# Patient Record
Sex: MALE | Race: WHITE | Employment: UNEMPLOYED | URBAN - METROPOLITAN AREA
[De-identification: names, ages, dates, MRNs, and addresses within clinical notes are randomized per-mention and may not be internally consistent; named-entity substitution may affect disease eponyms.]

---

## 2023-01-30 ENCOUNTER — HOSPITAL ENCOUNTER (EMERGENCY)
Age: 73
Discharge: HOME | End: 2023-01-30
Attending: EMERGENCY MEDICINE
Payer: OTHER GOVERNMENT

## 2023-01-30 ENCOUNTER — APPOINTMENT (OUTPATIENT)
Dept: GENERAL RADIOLOGY | Age: 73
End: 2023-01-30
Payer: OTHER GOVERNMENT

## 2023-01-30 VITALS
OXYGEN SATURATION: 100 % | DIASTOLIC BLOOD PRESSURE: 75 MMHG | RESPIRATION RATE: 16 BRPM | SYSTOLIC BLOOD PRESSURE: 152 MMHG | HEART RATE: 98 BPM | TEMPERATURE: 98.5 F

## 2023-01-30 DIAGNOSIS — L03.031 CELLULITIS OF TOE OF RIGHT FOOT: Primary | ICD-10-CM

## 2023-01-30 PROCEDURE — 73660 X-RAY EXAM OF TOE(S): CPT

## 2023-01-30 PROCEDURE — 6370000000 HC RX 637 (ALT 250 FOR IP): Performed by: STUDENT IN AN ORGANIZED HEALTH CARE EDUCATION/TRAINING PROGRAM

## 2023-01-30 PROCEDURE — 99283 EMERGENCY DEPT VISIT LOW MDM: CPT

## 2023-01-30 RX ORDER — HYDROCODONE BITARTRATE AND ACETAMINOPHEN 5; 325 MG/1; MG/1
1 TABLET ORAL EVERY 6 HOURS PRN
Qty: 12 TABLET | Refills: 0 | Status: SHIPPED | OUTPATIENT
Start: 2023-01-30 | End: 2023-02-02

## 2023-01-30 RX ORDER — HYDROCODONE BITARTRATE AND ACETAMINOPHEN 7.5; 325 MG/1; MG/1
1 TABLET ORAL
Status: COMPLETED | OUTPATIENT
Start: 2023-01-30 | End: 2023-01-30

## 2023-01-30 RX ORDER — ONDANSETRON 4 MG/1
4 TABLET, ORALLY DISINTEGRATING ORAL
Status: COMPLETED | OUTPATIENT
Start: 2023-01-30 | End: 2023-01-30

## 2023-01-30 RX ORDER — DOXYCYCLINE HYCLATE 100 MG
100 TABLET ORAL 2 TIMES DAILY
Qty: 14 TABLET | Refills: 0 | Status: SHIPPED | OUTPATIENT
Start: 2023-01-30 | End: 2023-02-06

## 2023-01-30 RX ADMIN — ONDANSETRON 4 MG: 4 TABLET, ORALLY DISINTEGRATING ORAL at 16:31

## 2023-01-30 RX ADMIN — HYDROCODONE BITARTRATE AND ACETAMINOPHEN 1 TABLET: 7.5; 325 TABLET ORAL at 16:32

## 2023-01-30 ASSESSMENT — ENCOUNTER SYMPTOMS
CHEST TIGHTNESS: 0
VOMITING: 0
ABDOMINAL PAIN: 0
VOICE CHANGE: 0
EYE PAIN: 0
DIARRHEA: 0
SORE THROAT: 0
SHORTNESS OF BREATH: 0
APNEA: 0
FACIAL SWELLING: 0
WHEEZING: 0
PHOTOPHOBIA: 0
EYE DISCHARGE: 0
EYE REDNESS: 0
COUGH: 0
TROUBLE SWALLOWING: 0
RHINORRHEA: 0

## 2023-01-30 ASSESSMENT — PAIN DESCRIPTION - LOCATION: LOCATION: TOE (COMMENT WHICH ONE)

## 2023-01-30 ASSESSMENT — PAIN SCALES - GENERAL: PAINLEVEL_OUTOF10: 10

## 2023-01-30 ASSESSMENT — PAIN DESCRIPTION - ORIENTATION: ORIENTATION: RIGHT

## 2023-01-30 NOTE — ED PROVIDER NOTES
Emergency Department Provider Note                   PCP:                No primary care provider on file. Age: 67 y.o. Sex: male       ICD-10-CM    1. Cellulitis of toe of right foot  L03.031 HYDROcodone-acetaminophen (NORCO) 5-325 MG per tablet          DISPOSITION Decision To Discharge 01/30/2023 04:50:12 PM        Medical Decision Making  In summary this is a 59-year-old male patient presenting with symptoms most consistent with simple skin cellulitis. Based on my evaluation I feel the patient is at low risk for alternative diagnoses such as lymphangitis, abscess, sepsis, necrotizing infection, osteomyelitis. The reasoning behind my medical decision making process is that the patient is grossly well-appearing on exam and in no acute distress. Vitals are stable without fever, tachycardia, tachypnea, hypoxia, hypotension. Patient does not meet SIRS or qSOFA criteria for sepsis at this time. Considered lab work but due to very minor appearance of physical exam and well-appearing nature of patient, did not feel it is indicated today. My independent interpretation of X ray did not reveal soft tissue gas or infectious extension into the bone. No signs of pain out of proportion, necrosis, or subcutaneous emphysema. Given negative x ray and low suspicion for necrotizing fasciitis, do not feel CT warranted at this time. There is no fluctuance or purulence to suggest abscess. There is no lymphangitic streaking. No skin sloughing or mucous membrane involvement to suggest SJS. The plan for this patient is outpatient management. The patient will be started on a course of antibiotics. Advised follow-up with primary care doctor in the next few days to be sure symptoms are improving. I specifically counseled the patient warning signs that they return immediately for including but not limited to dyspnea, intractable fever, worsening symptoms.   The patient has verbalized understanding and is in agreement with the treatment plan. All concerns addressed. Amount and/or Complexity of Data Reviewed  Radiology: ordered. Risk  Prescription drug management. Complexity of Problem: 1 stable, acute illness. (3)    I have conducted an independent ordering and review of X-rays. Considerations: Shared decision making was utilized in the care of this patient. Considerations: The following labs and/or imaging studies were considered but not ordered: CBC, CMP, lactic, Pro-Regulo, CRP, ESR        Patient was discharged risks and benefits of hospitalization were discussed. ED Course as of 01/30/23 1656   Mon Jan 30, 2023   1649 4:49 PM  Agree with radiology interpretation of unremarkable toe x-ray [NR]      ED Course User Index  [NR] FLY Gonzáles        Orders Placed This Encounter   Procedures    XR TOE RIGHT (MIN 2 VIEWS)        Medications   ondansetron (ZOFRAN-ODT) disintegrating tablet 4 mg (4 mg Oral Given 1/30/23 1631)   HYDROcodone-acetaminophen (Randell LaPorte) 7.5-325 MG per tablet 1 tablet (1 tablet Oral Given 1/30/23 1632)       New Prescriptions    DOXYCYCLINE HYCLATE (VIBRA-TABS) 100 MG TABLET    Take 1 tablet by mouth 2 times daily for 7 days    HYDROCODONE-ACETAMINOPHEN (NORCO) 5-325 MG PER TABLET    Take 1 tablet by mouth every 6 hours as needed for Pain for up to 3 days. Intended supply: 3 days. Take lowest dose possible to manage pain Max Daily Amount: 4 tablets        Zoraida Maya is a 67 y.o. male who presents to the Emergency Department with chief complaint of    Chief Complaint   Patient presents with    Foot Pain      40-year-old male patient with history of stroke affecting the right side of his body presents today complaining of right great toe pain that is been ongoing for the past 3 to 4 days. He reports he noticed that it has turned red and painful and slightly swollen with a blister. States the pain is constant and mild to moderate in severity.   He states that he constantly is dragging that foot on the ground when using his wheelchair and he thinks this may have started the symptoms. He denies fevers, fatigue, other systemic symptoms. Denies lymphangitic streaking or drainage. Denies any other symptoms today. Patient is primary historian and quality is reliable. The history is provided by the patient. No  was used. Review of Systems   Constitutional:  Negative for fatigue and fever. HENT:  Negative for congestion, ear pain, facial swelling, hearing loss, rhinorrhea, sore throat, trouble swallowing and voice change. Eyes:  Negative for photophobia, pain, discharge, redness and visual disturbance. Respiratory:  Negative for apnea, cough, chest tightness, shortness of breath and wheezing. Cardiovascular:  Negative for chest pain and palpitations. Gastrointestinal:  Negative for abdominal pain, diarrhea and vomiting. Endocrine: Negative for polydipsia, polyphagia and polyuria. Genitourinary:  Negative for decreased urine volume, dysuria, flank pain, frequency and hematuria. Musculoskeletal:  Positive for arthralgias. Negative for joint swelling, myalgias and neck stiffness. Skin:  Positive for wound. Negative for rash. Neurological:  Negative for dizziness, syncope, speech difficulty, weakness, light-headedness and headaches. Hematological:  Does not bruise/bleed easily. Psychiatric/Behavioral:  Negative for self-injury and suicidal ideas. All other systems reviewed and are negative. History reviewed. No pertinent past medical history. History reviewed. No pertinent surgical history. History reviewed. No pertinent family history. Patient has no allergy information on record. Previous Medications    No medications on file        Vitals signs and nursing note reviewed.    Patient Vitals for the past 4 hrs:   Temp Pulse Resp BP SpO2   01/30/23 1632 -- -- 18 -- --   01/30/23 1603 98.5 °F (36.9 °C) 97 14 (!) 142/86 98 %          Physical Exam  Constitutional:       General: He is not in acute distress. Appearance: Normal appearance. He is normal weight. He is not ill-appearing, toxic-appearing or diaphoretic. HENT:      Head: Normocephalic and atraumatic. Right Ear: External ear normal.      Left Ear: External ear normal.      Nose: Nose normal.   Eyes:      Extraocular Movements: Extraocular movements intact. Cardiovascular:      Rate and Rhythm: Normal rate. Heart sounds: No murmur heard. Pulmonary:      Effort: Pulmonary effort is normal.      Breath sounds: Normal breath sounds. No wheezing. Musculoskeletal:        Legs:       Comments: There is erythema and warmth and very mild swelling to the right great toe worse at the distal medial and. The distal medial and there is a blistering purulent area without ulceration. It is tender to palpation. The patient is neurovascularly intact. The erythema does not extend past the base of the great toe and there is no lymphangitic streaking. Patient does have decreased range of motion but reports that his baseline due to his right-sided stroke. Skin:     General: Skin is warm and dry. Neurological:      Mental Status: He is alert and oriented to person, place, and time. Mental status is at baseline. Procedures    Results for orders placed or performed during the hospital encounter of 01/30/23   XR TOE RIGHT (MIN 2 VIEWS)    Narrative    History: Right foot pain. Ulceration right great toe    EXAM: 4 views right first digit    FINDINGS: There is osteopenia. Degenerative change of the right foot noted. No  plain film evidence of osteomyelitis. Impression    Chronic appearing changes without plain film evidence of  osteomyelitis. XR TOE RIGHT (MIN 2 VIEWS)   Final Result   Chronic appearing changes without plain film evidence of   osteomyelitis.                           Voice dictation software was used during the making of this note.  This software is not perfect and grammatical and other typographical errors may be present. This note has not been completely proofread for errors.      [facility] [place]  01/30/23 7910

## 2023-01-30 NOTE — DISCHARGE INSTRUCTIONS
Your toe appears to have a skin infection. Your x-ray was normal.  We will treat this with antibiotics and pain medicine. Please take the antibiotics for the full 7 days to treat your infection. Please be sure to follow-up with your family doctor for reassessment and to be sure that your symptoms have improved. We would love to help you get a primary care doctor for follow-up after your emergency department visit. Please call [phone] between 7AM - 6PM Monday to Friday. A care navigator will be able to assist you with setting up a doctor close to your home. Risk of opioid analgesics include, but are not limited to: Overdosing can stop or slow your breathing and lead to death; fractures from falls; drowsiness leading to injury; tolerance, dependence and addiction. You should not operate any motorized vehicles or work from a height greater than ground level when taking opioid analgesics as they increase your fall risks. Do not combine these medications with any other opioid, to include street opioids such as heroin. Do not combine these medications with benzodiazepines like Xanax or alcohol as this may cause severe respiratory depression and death. As we discussed, I did not find a life threatening cause of your symptoms today. However, THAT DOES NOT MEAN IT COULD NOT DEVELOP. If you develop ANY new or worsening symptoms, it is critical that you return for re-evaluation. This includes any symptoms that are concerning to you, especially symptoms such as fevers, red streaking up your leg, skin sloughing, drainage from your foot. If you do not return for re-evaluation, you risk serious complications, including death.

## 2023-01-30 NOTE — ED NOTES
[address] [facility] and notified Pinky Dickens that pt would be returning with discharge papers and 2 prescriptions.       Wilbert Lipscomb, ALESSIA  01/30/23 2350

## 2023-01-30 NOTE — ED TRIAGE NOTES
Here from nursing facility by EMS, pain to right foot, retractions from previous foot. Pt drags foot while pushing himself in a wheelchair. Pt has a sore on his right great toe. Pt alert and oriented X 4.

## 2023-01-31 NOTE — ED NOTES
I have reviewed discharge instructions with the patient. The patient verbalized understanding. Patient left ED via Discharge Method: stretcher to Nursing Home with transport. Opportunity for questions and clarification provided. Patient given 2 scripts. To continue your aftercare when you leave the hospital, you may receive an automated call from our care team to check in on how you are doing. This is a free service and part of our promise to provide the best care and service to meet your aftercare needs.  If you have questions, or wish to unsubscribe from this service please call [phone]. Thank you for Choosing our [facility] Emergency Department.       Tommie Hector RN  01/30/23 0019